# Patient Record
Sex: MALE | Race: WHITE | NOT HISPANIC OR LATINO | Employment: FULL TIME | URBAN - METROPOLITAN AREA
[De-identification: names, ages, dates, MRNs, and addresses within clinical notes are randomized per-mention and may not be internally consistent; named-entity substitution may affect disease eponyms.]

---

## 2017-08-14 ENCOUNTER — APPOINTMENT (OUTPATIENT)
Dept: RADIOLOGY | Facility: MEDICAL CENTER | Age: 27
End: 2017-08-14
Attending: ORTHOPAEDIC SURGERY
Payer: COMMERCIAL

## 2017-10-16 ENCOUNTER — APPOINTMENT (OUTPATIENT)
Dept: RADIOLOGY | Facility: MEDICAL CENTER | Age: 27
End: 2017-10-16
Attending: ORTHOPAEDIC SURGERY
Payer: COMMERCIAL

## 2017-10-16 DIAGNOSIS — M25.561 RIGHT KNEE PAIN, UNSPECIFIED CHRONICITY: ICD-10-CM

## 2017-10-16 PROCEDURE — 73721 MRI JNT OF LWR EXTRE W/O DYE: CPT | Mod: RT

## 2018-04-30 ENCOUNTER — APPOINTMENT (OUTPATIENT)
Dept: ADMISSIONS | Facility: MEDICAL CENTER | Age: 28
End: 2018-04-30
Attending: ORTHOPAEDIC SURGERY
Payer: COMMERCIAL

## 2018-04-30 RX ORDER — IBUPROFEN 200 MG
200 TABLET ORAL EVERY 6 HOURS PRN
COMMUNITY
End: 2018-05-25

## 2018-05-15 ENCOUNTER — HOSPITAL ENCOUNTER (OUTPATIENT)
Facility: MEDICAL CENTER | Age: 28
End: 2018-05-15
Attending: ORTHOPAEDIC SURGERY | Admitting: ORTHOPAEDIC SURGERY
Payer: COMMERCIAL

## 2018-05-15 VITALS
RESPIRATION RATE: 14 BRPM | DIASTOLIC BLOOD PRESSURE: 78 MMHG | OXYGEN SATURATION: 93 % | WEIGHT: 132.06 LBS | SYSTOLIC BLOOD PRESSURE: 152 MMHG | TEMPERATURE: 97.5 F | HEART RATE: 100 BPM | HEIGHT: 64 IN | BODY MASS INDEX: 22.55 KG/M2

## 2018-05-15 PROCEDURE — 500423 HCHG DRESSING, ABD COMBINE: Performed by: ORTHOPAEDIC SURGERY

## 2018-05-15 PROCEDURE — 500673 HCHG GUIDE PIN, ARTHRX NITINOL: Performed by: ORTHOPAEDIC SURGERY

## 2018-05-15 PROCEDURE — 700105 HCHG RX REV CODE 258: Performed by: ORTHOPAEDIC SURGERY

## 2018-05-15 PROCEDURE — 502580 HCHG PACK, KNEE ARTHROSCOPY: Performed by: ORTHOPAEDIC SURGERY

## 2018-05-15 PROCEDURE — 502000 HCHG MISC OR IMPLANTS RC 0278: Performed by: ORTHOPAEDIC SURGERY

## 2018-05-15 PROCEDURE — 700101 HCHG RX REV CODE 250

## 2018-05-15 PROCEDURE — E0114 CRUTCH UNDERARM PAIR NO WOOD: HCPCS | Performed by: ORTHOPAEDIC SURGERY

## 2018-05-15 PROCEDURE — C1769 GUIDE WIRE: HCPCS | Performed by: ORTHOPAEDIC SURGERY

## 2018-05-15 PROCEDURE — 160035 HCHG PACU - 1ST 60 MINS PHASE I: Performed by: ORTHOPAEDIC SURGERY

## 2018-05-15 PROCEDURE — 700111 HCHG RX REV CODE 636 W/ 250 OVERRIDE (IP)

## 2018-05-15 PROCEDURE — 500562 HCHG FIBERWIRE: Performed by: ORTHOPAEDIC SURGERY

## 2018-05-15 PROCEDURE — 700102 HCHG RX REV CODE 250 W/ 637 OVERRIDE(OP)

## 2018-05-15 PROCEDURE — 160029 HCHG SURGERY MINUTES - 1ST 30 MINS LEVEL 4: Performed by: ORTHOPAEDIC SURGERY

## 2018-05-15 PROCEDURE — 160009 HCHG ANES TIME/MIN: Performed by: ORTHOPAEDIC SURGERY

## 2018-05-15 PROCEDURE — 160046 HCHG PACU - 1ST 60 MINS PHASE II: Performed by: ORTHOPAEDIC SURGERY

## 2018-05-15 PROCEDURE — 160041 HCHG SURGERY MINUTES - EA ADDL 1 MIN LEVEL 4: Performed by: ORTHOPAEDIC SURGERY

## 2018-05-15 PROCEDURE — 501838 HCHG SUTURE GENERAL: Performed by: ORTHOPAEDIC SURGERY

## 2018-05-15 PROCEDURE — C1713 ANCHOR/SCREW BN/BN,TIS/BN: HCPCS | Performed by: ORTHOPAEDIC SURGERY

## 2018-05-15 PROCEDURE — 160048 HCHG OR STATISTICAL LEVEL 1-5: Performed by: ORTHOPAEDIC SURGERY

## 2018-05-15 PROCEDURE — 160025 RECOVERY II MINUTES (STATS): Performed by: ORTHOPAEDIC SURGERY

## 2018-05-15 PROCEDURE — 160002 HCHG RECOVERY MINUTES (STAT): Performed by: ORTHOPAEDIC SURGERY

## 2018-05-15 PROCEDURE — A9270 NON-COVERED ITEM OR SERVICE: HCPCS

## 2018-05-15 PROCEDURE — 160022 HCHG BLOCK: Performed by: ORTHOPAEDIC SURGERY

## 2018-05-15 PROCEDURE — A6222 GAUZE <=16 IN NO W/SAL W/O B: HCPCS | Performed by: ORTHOPAEDIC SURGERY

## 2018-05-15 PROCEDURE — 502240 HCHG MISC OR SUPPLY RC 0272: Performed by: ORTHOPAEDIC SURGERY

## 2018-05-15 DEVICE — IMPLANTABLE DEVICE: Type: IMPLANTABLE DEVICE | Status: FUNCTIONAL

## 2018-05-15 DEVICE — BTB TIGHTROPE: Type: IMPLANTABLE DEVICE | Status: FUNCTIONAL

## 2018-05-15 RX ORDER — GABAPENTIN 300 MG/1
CAPSULE ORAL
Status: COMPLETED
Start: 2018-05-15 | End: 2018-05-15

## 2018-05-15 RX ORDER — BUPIVACAINE HYDROCHLORIDE AND EPINEPHRINE 2.5; 5 MG/ML; UG/ML
INJECTION, SOLUTION EPIDURAL; INFILTRATION; INTRACAUDAL; PERINEURAL
Status: DISCONTINUED | OUTPATIENT
Start: 2018-05-15 | End: 2018-05-15 | Stop reason: HOSPADM

## 2018-05-15 RX ORDER — ACETAMINOPHEN 500 MG
TABLET ORAL
Status: COMPLETED
Start: 2018-05-15 | End: 2018-05-15

## 2018-05-15 RX ORDER — CELECOXIB 200 MG/1
CAPSULE ORAL
Status: COMPLETED
Start: 2018-05-15 | End: 2018-05-15

## 2018-05-15 RX ORDER — LIDOCAINE HYDROCHLORIDE 10 MG/ML
INJECTION, SOLUTION INFILTRATION; PERINEURAL
Status: DISPENSED
Start: 2018-05-15 | End: 2018-05-15

## 2018-05-15 RX ORDER — SODIUM CHLORIDE, SODIUM LACTATE, POTASSIUM CHLORIDE, CALCIUM CHLORIDE 600; 310; 30; 20 MG/100ML; MG/100ML; MG/100ML; MG/100ML
INJECTION, SOLUTION INTRAVENOUS
Status: DISCONTINUED | OUTPATIENT
Start: 2018-05-15 | End: 2018-05-16 | Stop reason: HOSPADM

## 2018-05-15 RX ADMIN — GABAPENTIN 300 MG: 300 CAPSULE ORAL at 11:22

## 2018-05-15 RX ADMIN — CELECOXIB 400 MG: 200 CAPSULE ORAL at 11:21

## 2018-05-15 RX ADMIN — ACETAMINOPHEN 1000 MG: 500 TABLET, COATED ORAL at 11:21

## 2018-05-15 ASSESSMENT — PAIN SCALES - GENERAL
PAINLEVEL_OUTOF10: 0
PAINLEVEL_OUTOF10: 2

## 2018-05-15 NOTE — OR SURGEON
Immediate Post OP Note    PreOp Diagnosis: RIGHT knee ACL recurrent tear, with retained hardware    PostOp Diagnosis: SAME     Procedure(s): RIGHT   ACL RECONSTRUCTION SCOPE - REVISION USING BONE PATELLAR TENDON BONE AUTOGRAFT, POSSIBLE TUNNEL BONE GRAFTING, PEARCE - Wound Class: Clean  HARDWARE REMOVAL ORTHO - TIBIAL STAPLES - Wound Class: Clean    Surgeon(s):  Sonya Mccabe M.D.    Anesthesiologist/Type of Anesthesia:  Anesthesiologist: Venkatesh Wing M.D./General    Surgical Staff:  Circulator: Claudia Branham R.N.  Scrub Person: Renate Joseph  Private Scrub: Omar Maldonado CRiveraNDARIUSZ    Specimens removed if any:  * No specimens in log *    Estimated Blood Loss: min    Findings: as above    Complications: none    Arthrex BTB tightrope, 10x28 [place] biocomposite tibial screw        5/15/2018 3:14 PM Sonya Mccabe M.D.

## 2018-05-15 NOTE — DISCHARGE INSTRUCTIONS
ACTIVITY: Rest and take it easy for the first 24 hours.  A responsible adult is recommended to remain with you during that time.  It is normal to feel sleepy.  We encourage you to not do anything that requires balance, judgment or coordination.    MILD FLU-LIKE SYMPTOMS ARE NORMAL. YOU MAY EXPERIENCE GENERALIZED MUSCLE ACHES, THROAT IRRITATION, HEADACHE AND/OR SOME NAUSEA.    FOR 24 HOURS DO NOT:  Drive, operate machinery or run household appliances.  Drink beer or alcoholic beverages.   Make important decisions or sign legal documents.    SPECIAL INSTRUCTIONS: Post-Operative ACL Instructions         Dressing and wound care:  Keep your knee dressing clean and dry after surgery.  Be aware that some spotting of the dressing with blood can occur and is normal.  You may remove this dressing 3 days after the operation.  Notice that you have two or three small incisions that have been closed with stitches.   The smaller incisions can be covered with band-aids, or gauze pads.  Then, wrap the knee with an ace bandage.  Band-aids and/or gauze should be changed and the knee re-wrapped each day after your surgical bandage is removed.       Shower / bathing: Keep your incisions dry and covered until your sutures are removed. Then, you may shower as long as your incisions are completely dry and not draining any fluid.  No swimming pools, hot tubs, or baths are recommended until at least 4 weeks after surgery.       Ice: Apply ice packs to your knee (15 minutes on the knee, 15 minutes off the knee) for the first week, as you feel necessary to help with the pain and swelling.  You may open the front Velcro straps of the brace and place ice packs on the front of your knee over the dressing.   If you have a circulating cold water unit, then use liberally as directed.       Elevation: Keep your leg elevated as much as possible to control swelling.  Be aware that a moderate amount of knee swelling is expected.  Place pillows under  your ankle (NOT under your knee!) to elevate your leg.  Do not place anything under your knee as you do not want the knee to get stiff in a bent or flexed position.       Exercise:   You may begin straight leg raising exercises in your brace the day after surgery. You may also work on bending and straightening the knee (from 0 to 90 degrees), as your pain level allows.  Physical therapy typically starts at approximately two weeks after surgery.     Brace: Notice that you are in a post-op knee immobilizer that keeps your leg straight when weight bearing.  The immobilizer is typically used for 10-14 days after surgery, and then changed to a more functional hinged knee brace if necessary.  Crutches can be useful for balance and to limit painful weight bearing for 1-2 weeks post-op.  You may put full weight on your operative leg unless otherwise noted.     Pain medication: Take your pain medicine, as needed and prescribed.  Do not drive or operate machinery while taking narcotic pain medication.  If you are also taking ibuprofen, do so with food as it can be irritating to your stomach.  You may resume ibuprofen/naproxen at anytime after surgery.       Aspirin: Please take one adult aspirin daily for two weeks following surgery.  You may start this on the day after surgery anytime after the procedure.           Problems:     If you are experiencing problems such as unexpected knee pain, redness, excessive swelling, discharge from your incisions, calf pain / swelling, or fevers / chills do not hesitate to call the office or go to the nearest emergency room.       Follow-up:     Make sure that you have a follow-up appointment 7-14 days following surgery.  Sutures will be removed and a prescription for physical therapy will be given to you at that time.  Arthroscopy photos will be reviewed at your first post-op appointment.         Sonya Mccabe MD   [facility]   [phone]    DIET: To avoid nausea,  slowly advance diet as tolerated, avoiding spicy or greasy foods for the first day.  Add more substantial food to your diet according to your physician's instructions.  INCREASE FLUIDS AND FIBER TO AVOID CONSTIPATION.    FOLLOW-UP APPOINTMENT:  A follow-up appointment should be arranged with your doctor; call to schedule.    You should CALL YOUR PHYSICIAN if you develop:  Fever greater than 101 degrees F.  Pain not relieved by medication, or persistent nausea or vomiting.  Excessive bleeding (blood soaking through dressing) or unexpected drainage from the wound.  Extreme redness or swelling around the incision site, drainage of pus or foul smelling drainage.  Inability to urinate or empty your bladder within 8 hours.  Problems with breathing or chest pain.    You should call 911 if you develop problems with breathing or chest pain.  If you are unable to contact your doctor or surgical center, you should go to the nearest emergency room or urgent care center.  Physician's telephone #: [phone]    If any questions arise, call your doctor.  If your doctor is not available, please feel free to call the Surgical Center at [phone].  The Center is open Monday through Friday from 7AM to 7PM.  You can also call the [place] HOTLINE open 24 hours/day, 7 days/week and speak to a nurse at [phone], or toll free at [phone].    A registered nurse may call you a few days after your surgery to see how you are doing after your procedure.    MEDICATIONS: Resume taking daily medication.  Take prescribed pain medication with food.  If no medication is prescribed, you may take non-aspirin pain medication if needed.  PAIN MEDICATION CAN BE VERY CONSTIPATING.  Take a stool softener or laxative such as senokot, pericolace, or milk of magnesia if needed.    Prescription given pre-operatively.  Last pain medication given at N/A.    If your physician has prescribed pain medication that includes Acetaminophen (Tylenol), do not  take additional Acetaminophen (Tylenol) while taking the prescribed medication.    Depression / Suicide Risk    As you are discharged from this [facility] facility, it is important to learn how to keep safe from harming yourself.    Recognize the warning signs:  · Abrupt changes in personality, positive or negative- including increase in energy   · Giving away possessions  · Change in eating patterns- significant weight changes-  positive or negative  · Change in sleeping patterns- unable to sleep or sleeping all the time   · Unwillingness or inability to communicate  · Depression  · Unusual sadness, discouragement and loneliness  · Talk of wanting to die  · Neglect of personal appearance   · Rebelliousness- reckless behavior  · Withdrawal from people/activities they love  · Confusion- inability to concentrate     If you or a loved one observes any of these behaviors or has concerns about self-harm, here's what you can do:  · Talk about it- your feelings and reasons for harming yourself  · Remove any means that you might use to hurt yourself (examples: pills, rope, extension cords, firearm)  · Get professional help from the community (Mental Health, Substance Abuse, psychological counseling)  · Do not be alone:Call your Safe Contact- someone whom you trust who will be there for you.  · Call your local CRISIS HOTLINE 437-8442 or 037-886-1708  · Call your local Children's Mobile Crisis Response Team [place] (484) 830-2304 or www.[employer]  · Call the toll free National Suicide Prevention Hotlines   · National Suicide Prevention Lifeline 851-338-UMBB (7178)  National Hope Line Network 800-SUICIDE (980-1678)    Peripheral Nerve Block Discharge Instructions from Same Day Surgery and Inpatient :    What to Expect - Lower Extremity  · The block may cause you to experience numbness and weakness in your hip and thigh, thigh and knee or calf and foot on the same side as your surgery  · Numbness, tingling and /  "or weakness are all normal. For some people, this may be an unpleasant sensation  · These issues will be resolved when the local anesthetic wears off   · You may experience numbness and tingling in your thigh on the same side as your surgery if the block medicine was injected at your groin area  · Numbness will make it difficult to walk  · You may have problems with balance and walking so be very careful   · Follow your surgeon's direction regarding weight bearing on your surgical limb  · Be very careful with your numb limb  Precautions  · The numbness may affect your balance  · Be careful when walking or moving around  · Your leg may be weak: be very careful putting weight on it  · If your surgeon did not specify a time, you should not bear weight for 24 hours  · Be sure to ask for help when you need it  · It is better to have help than to fall and hurt yourself  Prevent Injury  · Protect the limb like a baby  · Beware of exposing your limb to extreme heat or cold or trauma  · The limb may be injured without you noticing because it is numb  · Keep the limb elevated whenever possible  · Do not sleep on the limb  · Change the position of the limb regularly  · Avoid putting pressure on your surgical limb  Pain Control  · The initial block on the day of surgery will make your extremity feel \"numb\"  · Any consecutive injection including prior to discharge from the hospital will make your extremity feel \"numb\"  · You may feel an aching or burning when the local anesthesia starts to wear off  · Take pain pills as prescribed by your surgeon  · Call your surgeon or anesthesiologist if you do not have adequate pain control  "

## 2018-05-15 NOTE — OR NURSING
1508: To PACU from OR via gurney, sleeping, respirations spontaneous and non-labored via LMA. Cold therapy commenced via pad applied in OR over c/d/i R knee surgical dressings. R leg immobilizer insitu. R foot warm/pink/brisk CRF, R DP+2. RLE elevated on pillow.  1520: rouses spontaneously, denies pain and nausea, commenced po water at pt request. O2 weaning commenced. Pt able to dorsiflex and extend R foot, denies altered sensation R foot.  1535: Continues to verbalize comfort, O2 d/adam  1555: No change in surgical site assessment. Meets criteria to transfer to Stage 2

## 2018-05-16 NOTE — OP REPORT
DATE OF SERVICE:  05/15/2018    PREOPERATIVE DIAGNOSIS:  Right knee recurrent anterior cruciate ligament tear   with retained hardware.    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    PROCEDURE:  Right knee arthroscopy, revision anterior cruciate ligament   reconstruction using bone-patellar tendon-bone autograft, removal of hardware   (tibial staples).    SURGEON:  Sonya Mccabe MD    ASSISTANT:  Omar Maldonado CFA.    SECOND ASSISTANT:  ____, RN.    ANESTHESIOLOGIST:  Venkatesh Wing MD    TYPE OF ANESTHESIA:  General, with preoperative adductor canal block.    INTRAVENOUS FLUID:  1 liter of crystalloid.    ESTIMATED BLOOD LOSS:  Minimal.    DRAINS:  None.    SPECIMENS:  None.    COMPLICATIONS:  None.    EXPLANTS:  2 tibial staples.    IMPLANTS:  Arthrex BTB TightRope for femoral fixation, size 10x28 [place]   BioComposite screw for tibial fixation.    REASON FOR PROCEDURE:  The patient is a 27-year-old male, who underwent a   right knee anterior cruciate ligament reconstruction in 2012 with Dr. Carol Louis.  He was playing soccer over a year ago and thinks he re-tore his   graft then.  An MRI confirmed this.  We decided to proceed with revision   reconstruction.    OPERATION:  The patient was given a right adductor canal block by the   anesthesiologist before surgery.  Once back in the operating room, a breathing   tube was placed.  He was given 2 g of IV Ancef.  The right lower extremity   was prepped with ChloraPrep and draped in standard sterile fashion.  The knee   was examined.  There was a positive pivot shift and a weak anterior drawer   test.  A lateral portal was established.  A medial portal was established   under direct spinal needle localization.  A probe was then inserted.  There   were no obvious degenerative changes noted in the patellofemoral joint or on   the medial or lateral femoral condyles.  There was a complete anterior    cruciate ligament tear of the previously placed graft, which was scarred to   the PCL.  There was slight fraying noted on the inner edge of the lateral   meniscus which was debrided tangentially with a 4-0 aggressive shaver.  The   medial compartment was normal.  At this point, the arthroscope was removed.    An Esmarch was used to exsanguinate the limb and the tourniquet was inflated   to 250 mmHg.  A vertical incision was made.  The patellar tendon was   identified.  A parallel blade was then used to harvest the central 10 mm of   the patellar tendon.  Next, both a large and small saw blade were used to   harvest a 10x20 mm bone plug from the patella and a 10x25 mm bone plug from   the tibia.  This was passed off and prepared by the first assistant, Omar Maldonado CFA.  In the meantime, the medial tibial staples were identified.    They were prominent subcutaneously and causing some pain.  These were then   removed by taking a quarter inch osteotome and loosing the edges of the   staple.  Both staples were then pulled from the bone using a pair of pliers.    This did leave a cavitary defect just inferior to the tibial tunnel.  The   wound was irrigated copiously.  The patellar tendon defect was then closed   with 2-0 Vicryl.  Next, the arthroscope was placed back into the knee joint.    The 4-0 aggressive shaver was then used to remove the previous graft.  Careful   and minimal notchplasty was performed.  A 6 mm over the top guide was then   placed and the pin was then used and taken through both cortices.  The osseous   length on the femoral side was measured to 34 mm.  A 9 mm reamer was then   used and taken up approximately 25 mm.  The 4.5 mm drill was then used through   both cortices.  A #2 FiberWire suture was then brought up and wrapped around   the femur to hold the place of the tunnel.  Next, the tibial guide was placed.    The pin was then placed followed by an 11 mm reamer.  Osseous length on the    femoral side was measured to 34 mm and osseous length on the tibial side was   measured to 35 mm.  Excess bone dust and some graft material was removed from   the tibial tunnel.  The suture was then pulled down.  The BTB TightRope was   then pretensioned at 15 mm.  After pretensioning, the graft was brought up to   the knee.  Sutures were placed through a loop coming out of the anteromedial   tibia.  The sutures were then pulled out of the lateral aspect of the femur.    The button was noted to have been passed and captured.  The graft was then   pulled so the bone plug was well buried in the femoral tunnel.  Excellent   graft position was noted.  The knee was then placed in near full extension,   allowing approximately 10 degrees of flexion.  The tibial tunnel was then   evaluated.  A guide pin was placed followed by a 9 mm tap.  This was noted to   have somewhat of a soft bite.  Thus, a 10 x 28 mm BioComposite screw was   chosen.  This was taken up and buried slightly.  This allowed for full   compression of the bony aspect of the graft against the tunnel.  Notably, when   the graft was prepared a patellar tendon flip was used to widen the   circumference of the tibial side of the graft in order to fill the defect, due   to some canal osteolysis and previous placement of a soft tissue allograft.    GraftLink was noted to be near perfect.  Next, the arthroscope was placed back   into the knee.  The femoral side was tensioned slightly with the handles.    All instruments were then removed.  Some excess bone graft from the graft was   then placed into the patella as well as into the tibia.  The wound was   irrigated.  The patellar tendon repair was then reinforced medial to lateral   with an additional 0 Vicryl suture.  A 2-0 Vicryl was placed in the   subcutaneous tissue with a running 3-0 Monocryl in the subcuticular layer with   benzoin and Steri-Strips on the skin.  A total of 30 mL of 0.25% Marcaine   with  epinephrine was injected into the knee joint.  Portals were closed with   3-0 nylon.  A sterile dressing was applied.  All drapes were removed and the   tourniquet was deflated.  The patient was then placed in a knee immobilizer   after an ice pad had been placed on the knee itself.  This was wrapped with an   Ace wrap.  The knee immobilizer was then placed.  The patient was placed   supine on a stretcher and taken to the recovery room, in stable condition.       ____________________________________     MD CINDI NORRIS / SALMA    DD:  05/15/2018 15:25:47  DT:  05/15/2018 16:14:14    D#:  [id number]  Job#:  [id number]

## 2018-05-25 ENCOUNTER — APPOINTMENT (OUTPATIENT)
Dept: RADIOLOGY | Facility: MEDICAL CENTER | Age: 28
End: 2018-05-25
Attending: EMERGENCY MEDICINE
Payer: COMMERCIAL

## 2018-05-25 ENCOUNTER — HOSPITAL ENCOUNTER (EMERGENCY)
Facility: MEDICAL CENTER | Age: 28
End: 2018-05-25
Attending: EMERGENCY MEDICINE
Payer: COMMERCIAL

## 2018-05-25 VITALS
TEMPERATURE: 97.9 F | WEIGHT: 130 LBS | OXYGEN SATURATION: 98 % | DIASTOLIC BLOOD PRESSURE: 80 MMHG | RESPIRATION RATE: 16 BRPM | BODY MASS INDEX: 22.2 KG/M2 | HEIGHT: 64 IN | HEART RATE: 98 BPM | SYSTOLIC BLOOD PRESSURE: 136 MMHG

## 2018-05-25 DIAGNOSIS — M25.561 ACUTE PAIN OF RIGHT KNEE: ICD-10-CM

## 2018-05-25 DIAGNOSIS — M25.461 EFFUSION OF RIGHT KNEE: ICD-10-CM

## 2018-05-25 LAB
ALBUMIN SERPL BCP-MCNC: 3.8 G/DL (ref 3.2–4.9)
ALBUMIN/GLOB SERPL: 1.2 G/DL
ALP SERPL-CCNC: 56 U/L (ref 30–99)
ALT SERPL-CCNC: 36 U/L (ref 2–50)
ANION GAP SERPL CALC-SCNC: 11 MMOL/L (ref 0–11.9)
APPEARANCE UR: CLEAR
AST SERPL-CCNC: 28 U/L (ref 12–45)
BASOPHILS # BLD AUTO: 0.4 % (ref 0–1.8)
BASOPHILS # BLD: 0.03 K/UL (ref 0–0.12)
BILIRUB SERPL-MCNC: 1.5 MG/DL (ref 0.1–1.5)
BILIRUB UR QL STRIP.AUTO: NEGATIVE
BUN SERPL-MCNC: 19 MG/DL (ref 8–22)
CALCIUM SERPL-MCNC: 9.2 MG/DL (ref 8.5–10.5)
CHLORIDE SERPL-SCNC: 100 MMOL/L (ref 96–112)
CO2 SERPL-SCNC: 26 MMOL/L (ref 20–33)
COLOR UR: YELLOW
CREAT SERPL-MCNC: 0.86 MG/DL (ref 0.5–1.4)
EOSINOPHIL # BLD AUTO: 0.2 K/UL (ref 0–0.51)
EOSINOPHIL NFR BLD: 2.8 % (ref 0–6.9)
ERYTHROCYTE [DISTWIDTH] IN BLOOD BY AUTOMATED COUNT: 38.3 FL (ref 35.9–50)
GLOBULIN SER CALC-MCNC: 3.1 G/DL (ref 1.9–3.5)
GLUCOSE SERPL-MCNC: 86 MG/DL (ref 65–99)
GLUCOSE UR STRIP.AUTO-MCNC: NEGATIVE MG/DL
HCT VFR BLD AUTO: 39.9 % (ref 42–52)
HGB BLD-MCNC: 13.3 G/DL (ref 14–18)
IMM GRANULOCYTES # BLD AUTO: 0.05 K/UL (ref 0–0.11)
IMM GRANULOCYTES NFR BLD AUTO: 0.7 % (ref 0–0.9)
KETONES UR STRIP.AUTO-MCNC: NEGATIVE MG/DL
LACTATE BLD-SCNC: 1.2 MMOL/L (ref 0.5–2)
LACTATE BLD-SCNC: 1.6 MMOL/L (ref 0.5–2)
LEUKOCYTE ESTERASE UR QL STRIP.AUTO: NEGATIVE
LYMPHOCYTES # BLD AUTO: 1.4 K/UL (ref 1–4.8)
LYMPHOCYTES NFR BLD: 19.9 % (ref 22–41)
MCH RBC QN AUTO: 28.4 PG (ref 27–33)
MCHC RBC AUTO-ENTMCNC: 33.3 G/DL (ref 33.7–35.3)
MCV RBC AUTO: 85.3 FL (ref 81.4–97.8)
MICRO URNS: NORMAL
MONOCYTES # BLD AUTO: 0.76 K/UL (ref 0–0.85)
MONOCYTES NFR BLD AUTO: 10.8 % (ref 0–13.4)
NEUTROPHILS # BLD AUTO: 4.59 K/UL (ref 1.82–7.42)
NEUTROPHILS NFR BLD: 65.4 % (ref 44–72)
NITRITE UR QL STRIP.AUTO: NEGATIVE
NRBC # BLD AUTO: 0 K/UL
NRBC BLD-RTO: 0 /100 WBC
PH UR STRIP.AUTO: 7.5 [PH]
PLATELET # BLD AUTO: 291 K/UL (ref 164–446)
PMV BLD AUTO: 9.6 FL (ref 9–12.9)
POTASSIUM SERPL-SCNC: 3.7 MMOL/L (ref 3.6–5.5)
PROT SERPL-MCNC: 6.9 G/DL (ref 6–8.2)
PROT UR QL STRIP: NEGATIVE MG/DL
RBC # BLD AUTO: 4.68 M/UL (ref 4.7–6.1)
RBC UR QL AUTO: NEGATIVE
SODIUM SERPL-SCNC: 137 MMOL/L (ref 135–145)
SP GR UR STRIP.AUTO: 1.01
UROBILINOGEN UR STRIP.AUTO-MCNC: 0.2 MG/DL
WBC # BLD AUTO: 7 K/UL (ref 4.8–10.8)

## 2018-05-25 PROCEDURE — 85025 COMPLETE CBC W/AUTO DIFF WBC: CPT

## 2018-05-25 PROCEDURE — 71045 X-RAY EXAM CHEST 1 VIEW: CPT

## 2018-05-25 PROCEDURE — 87040 BLOOD CULTURE FOR BACTERIA: CPT | Mod: 91

## 2018-05-25 PROCEDURE — 81003 URINALYSIS AUTO W/O SCOPE: CPT

## 2018-05-25 PROCEDURE — 93971 EXTREMITY STUDY: CPT

## 2018-05-25 PROCEDURE — 99284 EMERGENCY DEPT VISIT MOD MDM: CPT

## 2018-05-25 PROCEDURE — 87086 URINE CULTURE/COLONY COUNT: CPT

## 2018-05-25 PROCEDURE — 36415 COLL VENOUS BLD VENIPUNCTURE: CPT

## 2018-05-25 PROCEDURE — 80053 COMPREHEN METABOLIC PANEL: CPT

## 2018-05-25 PROCEDURE — 83605 ASSAY OF LACTIC ACID: CPT | Mod: 91

## 2018-05-25 PROCEDURE — 73560 X-RAY EXAM OF KNEE 1 OR 2: CPT | Mod: RT

## 2018-05-25 RX ORDER — KETOROLAC TROMETHAMINE 10 MG/1
10 TABLET, FILM COATED ORAL EVERY 6 HOURS PRN
Qty: 22 TAB | Refills: 0 | Status: SHIPPED | OUTPATIENT
Start: 2018-05-25

## 2018-05-25 RX ORDER — OXYCODONE AND ACETAMINOPHEN 10; 325 MG/1; MG/1
1-1.5 TABLET ORAL EVERY 4 HOURS PRN
COMMUNITY

## 2018-05-25 NOTE — ED NOTES
The Medication Reconciliation process has been completed by interviewing the patient    Allergies have been reviewed  Antibiotic use in 30 days - none    Home Pharmacy:  [facility]

## 2018-05-25 NOTE — ED NOTES
Knee surgery on 5/15.    C/O increased welling and pain to right knee with redness, tenderness to right calf. Pt is tachycardic with increased respirations. CHarge informed of sepsis protocol orders placed. Patient to senior antonioe to await room assignment.

## 2018-05-25 NOTE — ED PROVIDER NOTES
ED Provider Note    CHIEF COMPLAINT  Chief Complaint   Patient presents with   • Knee Pain     right   • Knee Swelling   • Post-Op Complications       HPI  Peter Jonas is a 27 y.o. male who presents with right knee pain, increased swelling and pain for the last 2 hours. No drainage. No fever no chills. Pain worse with motion. He has surgery, 10 days ago, ACL surgery with patellar tendon use. No chest pain or shortness of breath no nausea no vomiting. Pain moderate dull with increased swelling sudden onset right knee.    REVIEW OF SYSTEMS  See HPI for further details. Denies other G.I., G.U.. endrocine, cardiovascular, respriatory or neurological problems. All other systems are negative.     PAST MEDICAL HISTORY  No past medical history on file.    FAMILY HISTORY  No family history on file.    SOCIAL HISTORY  Social History     Social History   • Marital status: Single     Spouse name: N/A   • Number of children: N/A   • Years of education: N/A     Social History Main Topics   • Smoking status: Never Smoker   • Smokeless tobacco: Never Used   • Alcohol use Yes      Comment: 5 per week   • Drug use: No   • Sexual activity: Not on file     Other Topics Concern   • Not on file     Social History Narrative   • No narrative on file       SURGICAL HISTORY  Past Surgical History:   Procedure Laterality Date   • ACL RECONSTRUCTION SCOPE Right 5/15/2018    Procedure: ACL RECONSTRUCTION SCOPE - REVISION USING BONE PATELLAR TENDON BONE AUTOGRAFT, POSSIBLE TUNNEL BONE GRAFTING, PEARCE;  Surgeon: Sonya Mccabe M.D.;  Location: [facility];  Service: Orthopedics   • HARDWARE REMOVAL ORTHO Right 5/15/2018    Procedure: HARDWARE REMOVAL ORTHO - TIBIAL STAPLES;  Surgeon: Sonya Mccabe M.D.;  Location: [facility];  Service: Orthopedics   • ACL RECONSTRUCTION Right 10/2012       CURRENT MEDICATIONS  Home Medications    **Home medications have not yet been reviewed for this encounter**    "      ALLERGIES  No Known Allergies    PHYSICAL EXAM  VITAL SIGNS: /86   Pulse (!) 138   Temp 36.6 °C (97.9 °F) (Oral)   Resp 20   Ht 1.626 m (5' 4\")   Wt 59 kg (130 lb)   SpO2 99%   BMI 22.31 kg/m²   Constitutional: Well developed, Well nourished, No acute distress, Non-toxic appearance.   HENT: Normocephalic, Atraumatic, Bilateral external ears normal, Oropharynx moist, No oral exudates, Nose normal.   Eyes: PERRL, EOMI, Conjunctiva normal, No discharge.   Neck: Normal range of motion, No tenderness, Supple, No stridor.   Lymphatic: No lymphadenopathy noted.   Cardiovascular: Normal heart rate, Normal rhythm, No murmurs, No rubs, No gallops.   Thorax & Lungs: Normal breath sounds, No respiratory distress, No wheezing, No chest tenderness.   Abdomen:  No tenderness, no guarding no rigidity and the abdomen is soft.  No masses, No pulsatile masses.  Skin: Warm, Dry, No erythema, No rash.   Back: No tenderness, No CVA tenderness.   Extremities: Intact distal pulses, examination right knee, 100 mL effusion, there is a vertical incision that does not appear to be infected no pus. There is heat and redness around the knee. Range of motion is 20/40. Neurovascular is intact to the entire leg. There is increased diameter, right calf compared to the left, No cyanosis, No clubbing.   Musculoskeletal: Good range of motion in all major joints. No tenderness to palpation or major deformities noted.   Neurologic: Alert & oriented x 3, Normal motor function, Normal sensory function, No focal deficits noted.   Psychiatric: Affect normal, Judgment normal, Mood normal.       RADIOLOGY/PROCEDURES  LE VENOUS DUPLEX (Specify in Comments Left, Right Or Bilateral)   Final Result      DX-KNEE 2- RIGHT   Final Result      Anterior soft tissue swelling following revision anterior cruciate ligament graft repair      Anterior cruciate ligament repair button is not as closely opposed to the cortex as is typical, could indicate " displacement. Recommend correlation with operative report      Medium-sized knee joint effusion is not necessarily abnormal in the perioperative period. If there is concern for infection, aspiration may help clarify      Curvilinear density over the dorsal soft tissues is new and could reflect soft tissue calcification. This is more posterior than expected for an intra-articular fragment. If MRI is obtained in follow-up, this area may be better analyzed      DX-CHEST-PORTABLE (1 VIEW)   Final Result      No acute cardiac or pulmonary abnormalities are identified.              COURSE & MEDICAL DECISION MAKING  Pertinent Labs & Imaging studies reviewed. (See chart for details) white count 7.0 hematocrit normal platelet count, there is no shift, electrolytes and renal function normal liver function normal lactic acid normal        He had knee surgery, 10 days ago, concern for infection or other etiology. Now sudden onset swelling of the knee. Through the right calf is larger than the left although it has been like that since surgery I will have him evaluated for DVT    Invasive study for DVT, negative for any superficial or deep thrombosis. I have talked with the orthopedic surgeon Dr. Arango do believe this is most likely a hemarthrosis and not infection as his white count is normal his temperature is normal and he is not having excessive pain. Dr. Arango recommends ice, compression, however the patient has any fever or any worse he should return to the emergency room or call Dr. Arango  FINAL IMPRESSION  1.   1. Effusion of right knee    2. Acute pain of right knee        2.   3.     Disposition  Discharge instructions are understood. This patient is to return if fever vomiting or no better in 12 hours. Follow up with the [facility] clinic or private physician. Information sheets on knee pain and effusion  Electronically signed by: Chris Chavez, 5/25/2018 3:32 PM

## 2018-05-26 NOTE — ED NOTES
Discharge instructions given. All questions answered. Prescriptions to be picked up at pts pharmacy. Pt to follow-up with Ortho. Pt verbalized understanding. All belongings with pt. Pt in WC with significant other to lobby. Pt educated not to drive while on narcotics.

## 2018-05-26 NOTE — DISCHARGE INSTRUCTIONS
Joint Pain    Ice bag to knee, compression dressing  Joint pain, which is also called arthralgia, can be caused by many things. Joint pain often goes away when you follow your health care provider's instructions for relieving pain at home. However, joint pain can also be caused by conditions that require further treatment. Common causes of joint pain include:  · Bruising in the area of the joint.  · Overuse of the joint.  · Wear and tear on the joints that occur with aging (osteoarthritis).  · Various other forms of arthritis.  · A buildup of a crystal form of uric acid in the joint (gout).  · Infections of the joint (septic arthritis) or of the bone (osteomyelitis).  Your health care provider may recommend medicine to help with the pain. If your joint pain continues, additional tests may be needed to diagnose your condition.  Follow these instructions at home:  Watch your condition for any changes. Follow these instructions as directed to lessen the pain that you are feeling.  · Take medicines only as directed by your health care provider.  · Rest the affected area for as long as your health care provider says that you should. If directed to do so, raise the painful joint above the level of your heart while you are sitting or lying down.  · Do not do things that cause or worsen pain.  · If directed, apply ice to the painful area:  ¨ Put ice in a plastic bag.  ¨ Place a towel between your skin and the bag.  ¨ Leave the ice on for 20 minutes, 2-3 times per day.  · Wear an elastic bandage, splint, or sling as directed by your health care provider. Loosen the elastic bandage or splint if your fingers or toes become numb and tingle, or if they turn cold and blue.  · Begin exercising or stretching the affected area as directed by your health care provider. Ask your health care provider what types of exercise are safe for you.  · Keep all follow-up visits as directed by your health care provider. This is  important.  Contact a health care provider if:  · Your pain increases, and medicine does not help.  · Your joint pain does not improve within 3 days.  · You have increased bruising or swelling.  · You have a fever.  · You lose 10 lb (4.5 kg) or more without trying.  Get help right away if:  · You are not able to move the joint.  · Your fingers or toes become numb or they turn cold and blue.  This information is not intended to replace advice given to you by your health care provider. Make sure you discuss any questions you have with your health care provider.  Document Released: 12/18/2006 Document Revised: 05/19/2017 Document Reviewed: 09/29/2015  [employer] Patient Education © 2017 [employer]  Return if fever, vomiting or if no better in 12 hours.

## 2018-05-27 LAB
BACTERIA UR CULT: NORMAL
SIGNIFICANT IND 70042: NORMAL
SITE SITE: NORMAL
SOURCE SOURCE: NORMAL

## 2018-05-30 LAB
BACTERIA BLD CULT: NORMAL
BACTERIA BLD CULT: NORMAL
SIGNIFICANT IND 70042: NORMAL
SIGNIFICANT IND 70042: NORMAL
SITE SITE: NORMAL
SITE SITE: NORMAL
SOURCE SOURCE: NORMAL
SOURCE SOURCE: NORMAL

## 2020-05-01 ENCOUNTER — OFFICE VISIT (OUTPATIENT)
Dept: URGENT CARE | Facility: CLINIC | Age: 30
End: 2020-05-01
Payer: COMMERCIAL

## 2020-05-01 VITALS
RESPIRATION RATE: 16 BRPM | BODY MASS INDEX: 22.2 KG/M2 | SYSTOLIC BLOOD PRESSURE: 116 MMHG | TEMPERATURE: 99.1 F | HEIGHT: 64 IN | OXYGEN SATURATION: 96 % | DIASTOLIC BLOOD PRESSURE: 70 MMHG | WEIGHT: 130 LBS | HEART RATE: 125 BPM

## 2020-05-01 DIAGNOSIS — J30.2 SEASONAL ALLERGIES: ICD-10-CM

## 2020-05-01 DIAGNOSIS — K21.9 GASTROESOPHAGEAL REFLUX DISEASE, ESOPHAGITIS PRESENCE NOT SPECIFIED: ICD-10-CM

## 2020-05-01 PROCEDURE — 99202 OFFICE O/P NEW SF 15 MIN: CPT | Performed by: PHYSICIAN ASSISTANT

## 2020-05-01 RX ORDER — IBUPROFEN 200 MG
200 TABLET ORAL EVERY 6 HOURS PRN
COMMUNITY

## 2020-05-01 ASSESSMENT — ENCOUNTER SYMPTOMS
FEVER: 0
MYALGIAS: 0
WHEEZING: 0
HEMOPTYSIS: 0
INSOMNIA: 1
DIZZINESS: 0
COUGH: 1
CONSTIPATION: 0
SHORTNESS OF BREATH: 0
NERVOUS/ANXIOUS: 1
BLOOD IN STOOL: 0
CHILLS: 0
PALPITATIONS: 1
SINUS PAIN: 0
VOMITING: 0
ORTHOPNEA: 0
WEIGHT LOSS: 0
NAUSEA: 0
ABDOMINAL PAIN: 1
SPUTUM PRODUCTION: 0
DIAPHORESIS: 0
HEADACHES: 1
WEAKNESS: 0
DIARRHEA: 1
SORE THROAT: 1
HEARTBURN: 1

## 2020-05-01 ASSESSMENT — FIBROSIS 4 INDEX: FIB4 SCORE: 0.47

## 2020-05-01 NOTE — PROGRESS NOTES
Subjective:      Peter Jonas is a 29 y.o. male who presents with Cough (sore throat,headache, heart burn digestion, diarrhea,  x it started monday or tuesday )    HPI:  This is a pleasant 29-year-old male presenting to the clinic with a mild cough and indigestion x 3 days.  Patient states his cough is intermittent and nonproductive.  Also explains mild indigestion after eating meals in which the discomfort is relieved after he drinks water.  The patient also informs me he was recently furloughed from his job and is experiencing some anxiety.  Is also anxious about COVID-19.  The patient has had one episode of loose stools but denies any blood or mucus in the stool.  He took Pepto-Bismol with symptom relief.  He denies any sinus pain or pressure, shortness of breath, fevers or chest pain.  Past medical history significant for seasonal allergies in which she has not tried any treatment this year.    PMH:  has no past medical history on file.     MEDS:   Current Outpatient Medications:   •  bismuth subsalicylate (PEPTO-BISMOL) 262 MG/15ML Suspension, Take 30 mL by mouth every 6 hours as needed., Disp: , Rfl:   •  ibuprofen (MOTRIN) 200 MG Tab, Take 200 mg by mouth every 6 hours as needed., Disp: , Rfl:   •  oxyCODONE-acetaminophen (PERCOCET-10)  MG Tab, Take 1-1.5 Tabs by mouth every four hours as needed for Severe Pain., Disp: , Rfl:   •  ketorolac (TORADOL) 10 MG Tab, Take 1 Tab by mouth every 6 hours as needed for up to 22 doses. (Patient not taking: Reported on 5/1/2020), Disp: 22 Tab, Rfl: 0     ALLERGIES: No Known Allergies     SURGHX:   Past Surgical History:   Procedure Laterality Date   • ACL RECONSTRUCTION SCOPE Right 5/15/2018    Procedure: ACL RECONSTRUCTION SCOPE - REVISION USING BONE PATELLAR TENDON BONE AUTOGRAFT, POSSIBLE TUNNEL BONE GRAFTING, PEARCE;  Surgeon: Sonya Mccabe M.D.;  Location: SURGERY [facility];  Service: Orthopedics   • HARDWARE REMOVAL ORTHO Right 5/15/2018     "Procedure: HARDWARE REMOVAL ORTHO - TIBIAL STAPLES;  Surgeon: Sonya Mcacbe M.D.;  Location: SURGERY [facility];  Service: Orthopedics   • ACL RECONSTRUCTION Right 10/2012     SOCHX:  reports that he has never smoked. He has never used smokeless tobacco. He reports current alcohol use. He reports that he does not use drugs.     FH: Family history was reviewed, no pertinent findings to report          Review of Systems   Constitutional: Negative for chills, diaphoresis, fever, malaise/fatigue and weight loss.   HENT: Positive for sore throat. Negative for congestion, ear pain and sinus pain.    Respiratory: Positive for cough. Negative for hemoptysis, sputum production, shortness of breath and wheezing.    Cardiovascular: Positive for palpitations (1 episode lasting only couple seconds). Negative for chest pain, orthopnea and leg swelling.   Gastrointestinal: Positive for abdominal pain (Mild abdominal discomfort after eating), diarrhea (1 episode of loose stools.  No blood or mucus noted will symptom relief with Pepto-Bismol.) and heartburn. Negative for blood in stool, constipation, melena, nausea and vomiting.   Musculoskeletal: Negative for myalgias.   Skin: Positive for rash (Nonpruritic rash on back abdomen x1 month). Negative for itching.   Neurological: Positive for headaches (Occasional headaches in the occipital region.). Negative for dizziness and weakness.   Psychiatric/Behavioral: The patient is nervous/anxious (Patient states he has felt anxious recently due to the COVID-19 pending.) and has insomnia.           Objective:     /70   Pulse (!) 125   Temp 37.3 °C (99.1 °F) (Temporal)   Resp 16   Ht 1.626 m (5' 4\")   Wt 59 kg (130 lb)   SpO2 96%   BMI 22.31 kg/m²      Physical Exam  Constitutional:       General: He is not in acute distress.     Appearance: Normal appearance. He is normal weight. He is not ill-appearing, toxic-appearing or diaphoretic.   HENT:      Head: " Normocephalic and atraumatic.      Right Ear: Tympanic membrane, ear canal and external ear normal.      Left Ear: Tympanic membrane, ear canal and external ear normal.      Nose: Congestion (Mild nasal mucosal edema and erythema) present. No rhinorrhea.      Mouth/Throat:      Comments: Mild posterior oropharynx erythema no edema noted.  No tonsillar exudates or swelling.  Uvula midline no deviation.  Postnasal drip appreciated  Eyes:      Conjunctiva/sclera: Conjunctivae normal.   Neck:      Musculoskeletal: Normal range of motion. No neck rigidity or muscular tenderness.   Cardiovascular:      Rate and Rhythm: Normal rate and regular rhythm.      Pulses: Normal pulses.      Heart sounds: Normal heart sounds.   Pulmonary:      Effort: Pulmonary effort is normal.      Breath sounds: Normal breath sounds. No wheezing or rhonchi.   Chest:      Chest wall: No tenderness.   Abdominal:      General: Abdomen is flat.      Palpations: Abdomen is soft.   Musculoskeletal:         General: No swelling.   Lymphadenopathy:      Cervical: No cervical adenopathy.   Skin:     General: Skin is warm.      Capillary Refill: Capillary refill takes less than 2 seconds.      Findings: Rash present.      Comments: Erythematous coalescent like rash on his back with some wrapping around to his left lower abdomen.   Neurological:      Mental Status: He is alert. Mental status is at baseline.   Psychiatric:         Mood and Affect: Mood normal.                 Assessment/Plan:       1. Seasonal allergies  -OTC Claritin and Flonase use as directed.    2. Gastroesophageal reflux disease, esophagitis presence not specified  -OTC Tums and omeprazole use as directed.  Patient has been compliant with the lifestyle modifications which can be done to decrease symptoms of GERD.    Continue to monitor rash, could be related to change in detergent.  Over-the-counter antihistamines and hydrocortisone cream as needed.    Differential diagnoses,  supportive care, and indications for immediate follow-up discussed with patient.   Instructed to return to clinic or nearest emergency department for any change in condition, further concerns, or worsening of symptoms.

## 2020-12-19 ENCOUNTER — SUPERVISING PHYSICIAN REVIEW (OUTPATIENT)
Dept: URGENT CARE | Facility: CLINIC | Age: 30
End: 2020-12-19

## 2020-12-19 NOTE — PROGRESS NOTES
I have reviewed and agree with history, assessment and plan for office encounter on 5/1/2020 with Advanced Practice Provider: Art Goldman PAC.  Face to face encounter/direct observation: No  Suggested changes to plan or follow-up: none   Roberto Mejia M.D.

## (undated) DEVICE — HUMID-VENT HEAT AND MOISTURE EXCHANGE- (50/BX)

## (undated) DEVICE — CANISTER SUCTION RIGID RED 1500CC (40EA/CA)

## (undated) DEVICE — TUBING PUMP WITH CONNECTOR REDEUCE (1EA)

## (undated) DEVICE — PACK KNEE ARTHROSCOPY SM OR - (2EA/CA)

## (undated) DEVICE — GLOVE BIOGEL SZ 6.5 SURGICAL PF LTX (50PR/BX 4BX/CA)

## (undated) DEVICE — TUBE CONNECTING SUCTION - CLEAR PLASTIC STERILE 72 IN (50EA/CA)

## (undated) DEVICE — SENSOR SPO2 NEO LNCS ADHESIVE (20/BX) SEE USER NOTES

## (undated) DEVICE — SUTURE GENERAL

## (undated) DEVICE — GLOVE 7.0 LF PF PROTEXIS (50PR/BX)

## (undated) DEVICE — GLOVE BIOGEL INDICATOR SZ 8 SURGICAL PF LTX - (50/BX 4BX/CA)

## (undated) DEVICE — BLADE SURGICAL #15 - (50/BX 3BX/CA)

## (undated) DEVICE — KIT ROOM DECONTAMINATION

## (undated) DEVICE — ELECTRODE 850 FOAM ADHESIVE - HYDROGEL RADIOTRNSPRNT (50/PK)

## (undated) DEVICE — GLOVE BIOGEL INDICATOR SZ 7SURGICAL PF LTX - (50/BX 4BX/CA)

## (undated) DEVICE — SPONGE GAUZESTER 4 X 4 4PLY - (128PK/CA)

## (undated) DEVICE — TOURNIQUET CUFF 34 X 4 ONE PORT DISP - STERILE (10/BX)

## (undated) DEVICE — SODIUM CHL IRRIGATION 0.9% 1000ML (12EA/CA)

## (undated) DEVICE — WATER IRRIGATION STERILE 1000ML (12EA/CA)

## (undated) DEVICE — PIN GUIDE FLEXIBLE VERSITOMIC

## (undated) DEVICE — GOWN SURGICAL X-LARGE ULTRA - FILM-REINFORCED (20/CA)

## (undated) DEVICE — PACK MINOR BASIN - (2EA/CA)

## (undated) DEVICE — PADDING CAST 6 IN STERILE - 6 X 4 YDS (24/CA)

## (undated) DEVICE — PIN GUIDE ARTHREX TIBIAL 2.4 SDS=6 (8TX3+1TX1+1TX2=27) (6EA/BX)

## (undated) DEVICE — PIN GUIDE ARTH FEM W/EYE 2.4 W/WIRE SDS=6 (8TX3=24) (6EA/BX)

## (undated) DEVICE — NEEDLE W/FACET TIP DULL VERSION W/STIMULATION CABLE SONOPLEX 21G X 4 (10/EA)"

## (undated) DEVICE — SUTURE 0 VICRYL PLUS CT-1 - 36 INCH (36/BX)

## (undated) DEVICE — SHAVER4.0 AGGRESSIVE + FORMLA (5EA/BX)

## (undated) DEVICE — DRAPE U ORTHOPEDIC - (10/BX)

## (undated) DEVICE — FIBERWIRE 2.0 (12EA/BX)

## (undated) DEVICE — DRESSING ABDOMINAL PAD STERILE 8 X 10" (360EA/CA)"

## (undated) DEVICE — NEPTUNE 4 PORT MANIFOLD - (20/PK)

## (undated) DEVICE — SODIUM CHL. IRRIGATION 0.9% 3000ML (4EA/CA 65CA/PF)

## (undated) DEVICE — PROTECTOR ULNA NERVE - (36PR/CA)

## (undated) DEVICE — GLOVE BIOGEL SZ 7 SURGICAL PF LTX - (50PR/BX 4BX/CA)

## (undated) DEVICE — SUTURE 3-0 MONOCRYL PLUS PS-1 - 27 INCH (36/BX)

## (undated) DEVICE — BAG, SPONGE COUNT 50600

## (undated) DEVICE — GLOVE BIOGEL INDICATOR SZ 7.5 SURGICAL PF LTX - (50PR/BX 4BX/CA)

## (undated) DEVICE — BANDAGE ELASTIC 6 IN X 5 YDS - LATEX FREE (10/BX)

## (undated) DEVICE — DRESSING XEROFORM 1X8 - (50/BX 4BX/CA)

## (undated) DEVICE — ELECTRODE DUAL RETURN W/ CORD - (50/PK)

## (undated) DEVICE — BLOCK

## (undated) DEVICE — CHLORAPREP 26 ML APPLICATOR - ORANGE TINT(25/CA)

## (undated) DEVICE — GLOVE BIOGEL SZ 7.5 SURGICAL PF LTX - (50PR/BX 4BX/CA)

## (undated) DEVICE — MASK, LARYNGEAL AIRWAY #4

## (undated) DEVICE — DRAPE LARGE 3 QUARTER - (20/CA)

## (undated) DEVICE — SUCTION INSTRUMENT YANKAUER BULBOUS TIP W/O VENT (50EA/CA)

## (undated) DEVICE — PADDING CAST 6 IN X 4 YDS - SOF-ROLL (6RL/BG 6BG/CA)

## (undated) DEVICE — KIT ANESTHESIA W/CIRCUIT & 3/LT BAG W/FILTER (20EA/CA)

## (undated) DEVICE — SUTURE 2-0 VICRYL PLUS CT-1 36 (36PK/BX)"

## (undated) DEVICE — GLOVE, LITE (PAIR)

## (undated) DEVICE — GOWN WARMING STANDARD FLEX - (30/CA)

## (undated) DEVICE — MASK ANESTHESIA ADULT  - (100/CA)

## (undated) DEVICE — HEAD HOLDER JUNIOR/ADULT

## (undated) DEVICE — GLOVE BIOGEL SZ 8 SURGICAL PF LTX - (50PR/BX 4BX/CA)

## (undated) DEVICE — SUTURE 3-0 ETHILON FS-1 - (36/BX) 30 INCH